# Patient Record
Sex: FEMALE | Race: WHITE | NOT HISPANIC OR LATINO
[De-identification: names, ages, dates, MRNs, and addresses within clinical notes are randomized per-mention and may not be internally consistent; named-entity substitution may affect disease eponyms.]

---

## 2022-11-21 ENCOUNTER — APPOINTMENT (OUTPATIENT)
Dept: ORTHOPEDIC SURGERY | Facility: CLINIC | Age: 67
End: 2022-11-21

## 2022-11-21 ENCOUNTER — TRANSCRIPTION ENCOUNTER (OUTPATIENT)
Age: 67
End: 2022-11-21

## 2022-11-21 DIAGNOSIS — Z78.9 OTHER SPECIFIED HEALTH STATUS: ICD-10-CM

## 2022-11-21 DIAGNOSIS — M19.012 PRIMARY OSTEOARTHRITIS, LEFT SHOULDER: ICD-10-CM

## 2022-11-21 PROBLEM — Z00.00 ENCOUNTER FOR PREVENTIVE HEALTH EXAMINATION: Status: ACTIVE | Noted: 2022-11-21

## 2022-11-21 PROCEDURE — 73030 X-RAY EXAM OF SHOULDER: CPT | Mod: LT

## 2022-11-21 PROCEDURE — 99204 OFFICE O/P NEW MOD 45 MIN: CPT

## 2022-11-21 PROCEDURE — 73010 X-RAY EXAM OF SHOULDER BLADE: CPT | Mod: LT

## 2022-11-21 NOTE — ASSESSMENT
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 \par Discussed trial of visco supplementation.\par Diclofenac prescribed.\par Consider GH injection.\par HEP for light stretching. \par RTO prn.

## 2022-11-21 NOTE — IMAGING
[Left] : left shoulder [Degenerative change] : Degenerative change [Glenohumeral arthritis] : Glenohumeral arthritis [FreeTextEntry1] : Humeral head osteophyte

## 2022-11-21 NOTE — PHYSICAL EXAM
[Left] : left shoulder [5 ___] : forward flexion 5[unfilled]/5 [5___] : internal rotation 5[unfilled]/5 [] : pain with strength testing [FreeTextEntry9] : \par ER 40

## 2022-11-21 NOTE — HISTORY OF PRESENT ILLNESS
[5] : 5 [Dull/Aching] : dull/aching [Sharp] : sharp [Constant] : constant [de-identified] : 11/21/22: 66 yo RHD female with left shoulder pain for a few months but worse since 11/20/22. She states she closed a drawer and felt a pull in her shoulder. She has constant pain. She has pain lifting her arm.  [] : no [FreeTextEntry1] : left shoulder  [FreeTextEntry5] : no reported injury, pain on and off for a few years. Recently pt was opening a cabinet and felt a tear. She has been experiencing constant pain since. [FreeTextEntry7] : to the elbow

## 2023-01-16 ENCOUNTER — APPOINTMENT (OUTPATIENT)
Dept: ORTHOPEDIC SURGERY | Facility: CLINIC | Age: 68
End: 2023-01-16
Payer: MEDICARE

## 2023-01-16 VITALS — BODY MASS INDEX: 23.9 KG/M2 | WEIGHT: 140 LBS | HEIGHT: 64 IN

## 2023-01-16 DIAGNOSIS — M16.12 UNILATERAL PRIMARY OSTEOARTHRITIS, LEFT HIP: ICD-10-CM

## 2023-01-16 DIAGNOSIS — M16.11 UNILATERAL PRIMARY OSTEOARTHRITIS, RIGHT HIP: ICD-10-CM

## 2023-01-16 DIAGNOSIS — M25.551 PAIN IN RIGHT HIP: ICD-10-CM

## 2023-01-16 PROCEDURE — 99214 OFFICE O/P EST MOD 30 MIN: CPT

## 2023-01-16 RX ORDER — DICLOFENAC SODIUM 75 MG/1
75 TABLET, DELAYED RELEASE ORAL TWICE DAILY
Qty: 60 | Refills: 3 | Status: DISCONTINUED | COMMUNITY
Start: 2022-11-21 | End: 2023-01-16

## 2023-01-16 RX ORDER — MELOXICAM 15 MG/1
15 TABLET ORAL DAILY
Qty: 30 | Refills: 1 | Status: ACTIVE | COMMUNITY
Start: 2023-01-16 | End: 1900-01-01

## 2023-01-16 NOTE — ASSESSMENT
[FreeTextEntry1] : mild b/l hip OA\par PT,meds\par follow up 6 weeks\par \par NSAIDs- Patient warned of risk of medication to GI tract, increased blood pressure, cardiac risk, and risk of fluid retention.  Advised to clear medication with internist or PCP if any concurrent health problem with heart, blood pressure, or GI system exists.\par \par Patient seen by Frida Hector PA-C,  with and under the supervision of Dr. Oliver Barber M.D.\par

## 2023-01-16 NOTE — HISTORY OF PRESENT ILLNESS
[Gradual] : gradual [6] : 6 [3] : 3 [Dull/Aching] : dull/aching [Localized] : localized [Tingling] : tingling [Nothing helps with pain getting better] : Nothing helps with pain getting better [Walking] : walking [de-identified] : 1/16/23- 68 y/o F presents for posterolateral  R hip pain X 6 months. Denies specific injury.  Denies groin pain. Denies radiation. Associated clicking. States she has been limping. Aggravated by walking and climbing stairs. Has tried diclofenac, without relief. Denies prior hip surgeries/physical therapy.  \par \par PMH: history of rectal CA (resected 2009) [] : no [FreeTextEntry1] : right hip [FreeTextEntry3] : 6 months  [FreeTextEntry5] : patient states she has pain, she hear a clicking and she limping, no injury   [de-identified] : activity

## 2023-01-16 NOTE — IMAGING
[Right] : right hip with pelvis [There are no fractures, subluxations or dislocations. No significant abnormalities are seen] : There are no fractures, subluxations or dislocations. No significant abnormalities are seen [Mild arthritis (Tonnis Grade 1)] : Mild arthritis (Tonnis Grade 1)